# Patient Record
Sex: MALE | NOT HISPANIC OR LATINO | URBAN - METROPOLITAN AREA
[De-identification: names, ages, dates, MRNs, and addresses within clinical notes are randomized per-mention and may not be internally consistent; named-entity substitution may affect disease eponyms.]

---

## 2021-01-05 ENCOUNTER — IMPORTED ENCOUNTER (OUTPATIENT)
Dept: URBAN - METROPOLITAN AREA CLINIC 1 | Facility: CLINIC | Age: 71
End: 2021-01-05

## 2021-01-05 PROBLEM — H25.13: Noted: 2021-01-05

## 2021-01-05 PROBLEM — E11.9: Noted: 2021-01-05

## 2021-01-05 PROBLEM — Z79.4: Noted: 2021-01-05

## 2021-01-05 PROCEDURE — 92004 COMPRE OPH EXAM NEW PT 1/>: CPT

## 2021-01-05 PROCEDURE — 92015 DETERMINE REFRACTIVE STATE: CPT

## 2021-01-05 NOTE — PATIENT DISCUSSION
1. DM II (Insulin) without sign of diabetic retinopathy and no blot heme on dilated retinal examination today OU:  Discussed the pathophysiology of diabetes and its effect on the eye and risk of blindness. Stressed the importance of strong glucose control. Advised of importance of at least yearly dilated examinations but to contact us immediately for any problems or concerns. Letter sent to Dr. Zeb Reyes. 2. Cataract OU: Observe for now without intervention. MRX for glasses given. Return for an appointment in 1 year 30/glare with Dr. Denver Salcedo.

## 2022-04-02 ASSESSMENT — VISUAL ACUITY
OD_SC: 20/25
OD_CC: J1
OD_GLARE: 20/50
OS_GLARE: 20/50
OS_CC: J1
OS_SC: 20/25

## 2022-04-02 ASSESSMENT — TONOMETRY
OS_IOP_MMHG: 17
OD_IOP_MMHG: 18